# Patient Record
(demographics unavailable — no encounter records)

---

## 2024-12-27 NOTE — PHYSICAL EXAM
[General Appearance - Alert] : alert [Facies] : the head and face were normal in appearance [Auscultation Breath Sounds / Voice Sounds] : breath sounds clear to auscultation bilaterally [Normal Chest Appearance] : the chest was normal in appearance [Apical Impulse] : quiet precordium with normal apical impulse [Heart Rate And Rhythm] : normal heart rate and rhythm [Heart Sounds] : normal S1 and S2 [No Murmur] : no murmurs  [Arterial Pulses] : normal upper and lower extremity pulses with no pulse delay [Heart Sounds Click] : no clicks [Abdomen Soft] : soft [Delayed Developmental Milestones] : normal neurologic development for age [Demonstrated Behavior - Infant Nonreactive To Parents] : interactive [General Appearance - Well-Appearing] : well appearing [Appearance Of Head] : the head was normocephalic [Sclera] : the conjunctiva were normal [EOMI] : ~T the extraocular movements were intact [Outer Ear] : the ears and nose were normal in appearance [Examination Of The Oral Cavity] : mucous membranes were moist and pink [Bowel Sounds] : normal bowel sounds [Nondistended] : nondistended [Abdomen Tenderness] : non-tender [Nail Clubbing] : no clubbing  or cyanosis of the fingernails [Musculoskeletal Exam: Normal Movement Of All Extremities] : normal movements of all extremities [] : no rash [Skin Turgor] : normal turgor [Skin Color & Pigmentation] : normal skin color and pigmentation

## 2024-12-27 NOTE — CONSULT LETTER
[Today's Date] : [unfilled] [Name] : Name: [unfilled] [] : : ~~ [Today's Date:] : [unfilled] [Dear  ___:] : Dear Dr. [unfilled]: [Consult] : I had the pleasure of evaluating your patient, [unfilled]. My full evaluation follows. [Sincerely,] : Sincerely, [Consult - Multiple Provider] : Thank you very much for allowing us to participate in the care of this patient. If you have any questions, please do not hesitate to contact us. [FreeTextEntry4] : Dinah Mandujano MD [FreeTextEntry5] : [address] [FreeTextEntry6] : [place] [address] [de-identified] : Wen Marinelli, MSN, CPNP AC, PC Pediatric Cardiology Pediatric Heart Transplant and Heart Failure [facility]    Sean Dangelo MD Pediatric Cardiologist Director of Pediatric Heart Transplant and Heart Failure [facility] [address] [place] [address] Phone: [phone]

## 2024-12-27 NOTE — CARDIOLOGY SUMMARY
[Today's Date] : [unfilled] [FreeTextEntry1] : NSR, ventricular rate 117bpm [FreeTextEntry2] : Summary: 1. Status post transcatheter PDA closure (4/2/2024, [facility] [place], Dr. Wayne). 2. The PDA device extends into the proximal LPA. There is flow turbulence in proximal LPA with no evidence of obstruction (peak velocity of 1.4 m/s) with normal doppler flow profile. 3. No obstruction in the proximal thoracic descending aorta from the PDA device. Normal Doppler flow profiles in the thoracic descending aorta. 4. No residual shunt identified across the ductus arteriosus. 5. No evidence of aortic valve regurgitation. 6. Normal left ventricular size, morphology and systolic function. 7. Normal right ventricular morphology with qualitatively normal size and systolic function. 8. No pericardial effusion. 9. There has been no significant interval change.

## 2024-12-27 NOTE — DISCUSSION/SUMMARY
[May participate in all age-appropriate activities] : [unfilled] May participate in all age-appropriate activities. [FreeTextEntry1] : ASSESSEMENT/PLAN:   Sung is a 2 year old s/p percutaneous cath closure of her PDA with a 8x7 Amplatzer plug II.  There is no residual shunt.  She does not have any restrictions on types of anesthetics that can be given or epinephrine administration needed for procedures. There is no need for SBE prophylaxis.   PLAN:  -follow up in 1 year with echocardiogram  [Needs SBE Prophylaxis] : [unfilled] does not need bacterial endocarditis prophylaxis

## 2024-12-27 NOTE — CARDIOLOGY SUMMARY
[Today's Date] : [unfilled] [FreeTextEntry1] : NSR, ventricular rate 117bpm [FreeTextEntry2] : Summary: 1. Status post transcatheter PDA closure (4/2/2024, [facility] [place], Dr. Wayne). 2. The PDA device extends into the proximal LPA. There is flow turbulence in proximal LPA with no evidence of obstruction (peak velocity of 1.4 m/s) with normal doppler flow profile. 3. No obstruction in the proximal thoracic descending aorta from the PDA device. Normal Doppler flow profiles in the thoracic descending aorta. 4. No residual shunt identified across the ductus arteriosus. 5. No evidence of aortic valve regurgitation. 6. Normal left ventricular size, morphology and systolic function. 7. Normal right ventricular morphology with qualitatively normal size and systolic function. 8. No pericardial effusion. 9. There has been no significant interval change.

## 2024-12-27 NOTE — CONSULT LETTER
[Today's Date] : [unfilled] [Name] : Name: [unfilled] [] : : ~~ [Today's Date:] : [unfilled] [Dear  ___:] : Dear Dr. [unfilled]: [Consult] : I had the pleasure of evaluating your patient, [unfilled]. My full evaluation follows. [Sincerely,] : Sincerely, [Consult - Multiple Provider] : Thank you very much for allowing us to participate in the care of this patient. If you have any questions, please do not hesitate to contact us. [FreeTextEntry4] : Dinah Mandujano MD [FreeTextEntry5] : [address] [FreeTextEntry6] : [place] [address] [de-identified] : Wen Marinelli, MSN, CPNP AC, PC Pediatric Cardiology Pediatric Heart Transplant and Heart Failure [facility]    Sean Dangelo MD Pediatric Cardiologist Director of Pediatric Heart Transplant and Heart Failure [facility] [address] [place] [address] Phone: [phone]

## 2024-12-27 NOTE — HISTORY OF PRESENT ILLNESS
[FreeTextEntry1] : Sung is a 2 year old female, ex preemie who had a moderate patent ductus arteriosus with left to right shunting and left atrial enlargement.  She underwent PDA closure on 4/2/2024 with an 8mm x 7mm Amplatzer Vascular Plug II (8mm x 7mm). She has done well following procedure. She is an active toddler, growing and developing normally.  She has normal energy level, no cyanosis, no sweating with feeds.